# Patient Record
Sex: MALE | Race: OTHER | NOT HISPANIC OR LATINO | URBAN - METROPOLITAN AREA
[De-identification: names, ages, dates, MRNs, and addresses within clinical notes are randomized per-mention and may not be internally consistent; named-entity substitution may affect disease eponyms.]

---

## 2023-08-07 ENCOUNTER — APPOINTMENT (OUTPATIENT)
Dept: URBAN - METROPOLITAN AREA CLINIC 256 | Age: 58
Setting detail: DERMATOLOGY
End: 2023-08-07

## 2023-08-07 DIAGNOSIS — L82.1 OTHER SEBORRHEIC KERATOSIS: ICD-10-CM

## 2023-08-07 DIAGNOSIS — L91.8 OTHER HYPERTROPHIC DISORDERS OF THE SKIN: ICD-10-CM

## 2023-08-07 DIAGNOSIS — B07.8 OTHER VIRAL WARTS: ICD-10-CM

## 2023-08-07 DIAGNOSIS — L81.4 OTHER MELANIN HYPERPIGMENTATION: ICD-10-CM

## 2023-08-07 DIAGNOSIS — Z71.89 OTHER SPECIFIED COUNSELING: ICD-10-CM

## 2023-08-07 DIAGNOSIS — D22 MELANOCYTIC NEVI: ICD-10-CM

## 2023-08-07 DIAGNOSIS — D18.0 HEMANGIOMA: ICD-10-CM

## 2023-08-07 DIAGNOSIS — L57.8 OTHER SKIN CHANGES DUE TO CHRONIC EXPOSURE TO NONIONIZING RADIATION: ICD-10-CM

## 2023-08-07 PROBLEM — D22.71 MELANOCYTIC NEVI OF RIGHT LOWER LIMB, INCLUDING HIP: Status: ACTIVE | Noted: 2023-08-07

## 2023-08-07 PROBLEM — D22.61 MELANOCYTIC NEVI OF RIGHT UPPER LIMB, INCLUDING SHOULDER: Status: ACTIVE | Noted: 2023-08-07

## 2023-08-07 PROBLEM — D22.5 MELANOCYTIC NEVI OF TRUNK: Status: ACTIVE | Noted: 2023-08-07

## 2023-08-07 PROBLEM — D22.72 MELANOCYTIC NEVI OF LEFT LOWER LIMB, INCLUDING HIP: Status: ACTIVE | Noted: 2023-08-07

## 2023-08-07 PROBLEM — D18.01 HEMANGIOMA OF SKIN AND SUBCUTANEOUS TISSUE: Status: ACTIVE | Noted: 2023-08-07

## 2023-08-07 PROBLEM — D22.62 MELANOCYTIC NEVI OF LEFT UPPER LIMB, INCLUDING SHOULDER: Status: ACTIVE | Noted: 2023-08-07

## 2023-08-07 PROCEDURE — OTHER DEFER: OTHER

## 2023-08-07 PROCEDURE — 11200 RMVL SKIN TAGS UP TO&INC 15: CPT

## 2023-08-07 PROCEDURE — OTHER SUNSCREEN RECOMMENDATIONS: OTHER

## 2023-08-07 PROCEDURE — OTHER SKIN TAG REMOVAL: OTHER

## 2023-08-07 PROCEDURE — 99203 OFFICE O/P NEW LOW 30 MIN: CPT | Mod: 25

## 2023-08-07 PROCEDURE — OTHER COUNSELING: OTHER

## 2023-08-07 PROCEDURE — OTHER MIPS QUALITY: OTHER

## 2023-08-07 ASSESSMENT — LOCATION ZONE DERM
LOCATION ZONE: ARM
LOCATION ZONE: AXILLAE
LOCATION ZONE: LEG
LOCATION ZONE: FACE
LOCATION ZONE: TRUNK

## 2023-08-07 ASSESSMENT — LOCATION SIMPLE DESCRIPTION DERM
LOCATION SIMPLE: LEFT FOREARM
LOCATION SIMPLE: LEFT UPPER ARM
LOCATION SIMPLE: LEFT LOWER BACK
LOCATION SIMPLE: ABDOMEN
LOCATION SIMPLE: LEFT SHOULDER
LOCATION SIMPLE: LEFT CHEEK
LOCATION SIMPLE: RIGHT FOREARM
LOCATION SIMPLE: RIGHT THIGH
LOCATION SIMPLE: LEFT THIGH
LOCATION SIMPLE: LEFT POSTERIOR AXILLA
LOCATION SIMPLE: UPPER BACK
LOCATION SIMPLE: LEFT UPPER BACK
LOCATION SIMPLE: RIGHT UPPER BACK
LOCATION SIMPLE: CHEST
LOCATION SIMPLE: RIGHT SHOULDER
LOCATION SIMPLE: RIGHT CHEEK

## 2023-08-07 ASSESSMENT — LOCATION DETAILED DESCRIPTION DERM
LOCATION DETAILED: LEFT INFERIOR LATERAL MIDBACK
LOCATION DETAILED: RIGHT ANTERIOR PROXIMAL THIGH
LOCATION DETAILED: RIGHT VENTRAL PROXIMAL FOREARM
LOCATION DETAILED: LEFT INFERIOR LATERAL MALAR CHEEK
LOCATION DETAILED: RIGHT SUPERIOR MEDIAL UPPER BACK
LOCATION DETAILED: LEFT MEDIAL UPPER BACK
LOCATION DETAILED: RIGHT ANTERIOR DISTAL THIGH
LOCATION DETAILED: LEFT SUPERIOR LATERAL LOWER BACK
LOCATION DETAILED: RIGHT ANTERIOR SHOULDER
LOCATION DETAILED: INFERIOR THORACIC SPINE
LOCATION DETAILED: LEFT ANTECUBITAL SKIN
LOCATION DETAILED: MIDDLE STERNUM
LOCATION DETAILED: RIGHT VENTRAL DISTAL FOREARM
LOCATION DETAILED: LEFT ANTERIOR PROXIMAL THIGH
LOCATION DETAILED: LEFT DISTAL DORSAL FOREARM
LOCATION DETAILED: RIGHT MEDIAL SUPERIOR CHEST
LOCATION DETAILED: LEFT PROXIMAL DORSAL FOREARM
LOCATION DETAILED: LEFT INFERIOR CENTRAL MALAR CHEEK
LOCATION DETAILED: RIGHT DISTAL DORSAL FOREARM
LOCATION DETAILED: RIGHT CENTRAL MALAR CHEEK
LOCATION DETAILED: LEFT ANTERIOR SHOULDER
LOCATION DETAILED: EPIGASTRIC SKIN
LOCATION DETAILED: LEFT CENTRAL MALAR CHEEK
LOCATION DETAILED: LEFT ANTERIOR DISTAL THIGH
LOCATION DETAILED: LEFT POSTERIOR AXILLA
LOCATION DETAILED: RIGHT PROXIMAL DORSAL FOREARM
LOCATION DETAILED: LEFT VENTRAL DISTAL FOREARM

## 2023-08-07 NOTE — PROCEDURE: SKIN TAG REMOVAL
Include Z78.9 (Other Specified Conditions Influencing Health Status) As An Associated Diagnosis?: No
Add Associated Diagnoses If Applicable When Selecting Medical Necessity: Yes
Detail Level: Detailed
Medical Necessity Information: It is in your best interest to select a reason for this procedure from the list below. All of these items fulfill various CMS LCD requirements except the new and changing color options.
Anesthesia Type: 1% lidocaine with epinephrine
Anesthesia Volume In Cc: 0.5
Medical Necessity Clause: This procedure was medically necessary because the lesions that were treated were:
Consent: Written consent obtained and the risks of skin tag removal was reviewed with the patient including but not limited to bleeding, pigmentary change, infection, pain, and remote possibility of scarring.

## 2023-08-07 NOTE — PROCEDURE: DEFER
X Size Of Lesion In Cm (Optional): 0
Introduction Text (Please End With A Colon): The following procedure was deferred: Patient has a board meeting, will like to come back get LN2 treatment.
Detail Level: Detailed

## 2023-08-07 NOTE — PROCEDURE: MIPS QUALITY
Detail Level: Detailed
Quality 431: Preventive Care And Screening: Unhealthy Alcohol Use - Screening: Patient not identified as an unhealthy alcohol user when screened for unhealthy alcohol use using a systematic screening method
Quality 226: Preventive Care And Screening: Tobacco Use: Screening And Cessation Intervention: Tobacco Screening not Performed
Quality 130: Documentation Of Current Medications In The Medical Record: Current Medications Documented
Quality 402: Tobacco Use And Help With Quitting Among Adolescents: Patient screened for tobacco and never smoked

## 2025-06-13 ENCOUNTER — ANCILLARY PROCEDURE (OUTPATIENT)
Dept: RADIOLOGY | Facility: CLINIC | Age: 60
End: 2025-06-13
Payer: COMMERCIAL

## 2025-06-17 ENCOUNTER — ANCILLARY PROCEDURE (OUTPATIENT)
Dept: GENERAL RADIOLOGY | Facility: CLINIC | Age: 60
End: 2025-06-17
Attending: NEUROLOGICAL SURGERY
Payer: COMMERCIAL

## 2025-06-17 ENCOUNTER — OFFICE VISIT (OUTPATIENT)
Dept: NEUROSURGERY | Facility: CLINIC | Age: 60
End: 2025-06-17
Attending: NEUROLOGICAL SURGERY
Payer: COMMERCIAL

## 2025-06-17 ENCOUNTER — TELEPHONE (OUTPATIENT)
Dept: NEUROSURGERY | Facility: CLINIC | Age: 60
End: 2025-06-17
Payer: COMMERCIAL

## 2025-06-17 VITALS — HEART RATE: 63 BPM | OXYGEN SATURATION: 97 % | SYSTOLIC BLOOD PRESSURE: 129 MMHG | DIASTOLIC BLOOD PRESSURE: 81 MMHG

## 2025-06-17 DIAGNOSIS — M47.22 CERVICAL SPONDYLOSIS WITH RADICULOPATHY: ICD-10-CM

## 2025-06-17 DIAGNOSIS — M47.22 CERVICAL SPONDYLOSIS WITH RADICULOPATHY: Primary | ICD-10-CM

## 2025-06-17 PROCEDURE — 3079F DIAST BP 80-89 MM HG: CPT | Performed by: NEUROLOGICAL SURGERY

## 2025-06-17 PROCEDURE — 99213 OFFICE O/P EST LOW 20 MIN: CPT | Performed by: NEUROLOGICAL SURGERY

## 2025-06-17 PROCEDURE — 1125F AMNT PAIN NOTED PAIN PRSNT: CPT | Performed by: NEUROLOGICAL SURGERY

## 2025-06-17 PROCEDURE — 3074F SYST BP LT 130 MM HG: CPT | Performed by: NEUROLOGICAL SURGERY

## 2025-06-17 PROCEDURE — 99203 OFFICE O/P NEW LOW 30 MIN: CPT | Performed by: NEUROLOGICAL SURGERY

## 2025-06-17 RX ORDER — DEXAMETHASONE 2 MG/1
2 TABLET ORAL 2 TIMES DAILY WITH MEALS
Qty: 14 TABLET | Refills: 0 | Status: SHIPPED | OUTPATIENT
Start: 2025-06-17

## 2025-06-17 RX ORDER — ROSUVASTATIN CALCIUM 10 MG/1
10 TABLET, COATED ORAL DAILY
COMMUNITY
Start: 2025-04-25

## 2025-06-17 RX ORDER — GABAPENTIN 300 MG/1
300 CAPSULE ORAL 3 TIMES DAILY
Qty: 50 CAPSULE | Refills: 1 | Status: SHIPPED | OUTPATIENT
Start: 2025-06-17

## 2025-06-17 RX ORDER — EZETIMIBE 10 MG/1
10 TABLET ORAL DAILY
COMMUNITY
Start: 2025-05-01

## 2025-06-17 ASSESSMENT — PAIN SCALES - GENERAL: PAINLEVEL_OUTOF10: SEVERE PAIN (7)

## 2025-06-17 NOTE — NURSING NOTE
Robert Bella is a 60 year old male who presents for:  Chief Complaint   Patient presents with    Consult     scheduled per Dr. Abrams staff message          Initial Vitals:  /81   Pulse 63   SpO2 97%  There is no height or weight on file to calculate BMI.. There is no height or weight on file to calculate BSA. BP completed using cuff size: regular  Severe Pain (7)    Nursing Comments:     Gena Santoro

## 2025-06-17 NOTE — LETTER
6/17/2025      Robert Bella  [address] [facility] [place] [address]      Dear Colleague,    Thank you for referring your patient, Robert Bella, to the [facility]OS[facility] [place]. Please see a copy of my visit note below.    It was a pleasure to see Robert Bella today in Neurosurgery Clinic. He is a 60 year old male who is here for evaluation of his right upper extremity radiculopathy.  His symptoms began in May.  This began with some pain on the back of the forearm and the elbow but then extended into the parascapular area.  He notes some burning in the tricep area and forearm with numbness mainly of the 2nd and 3rd fingers in the palm of the hand.    He has noted some weakness of the right upper extremity and feels like he is having fasciculations of the tricep and pectoral muscles.  He does have occasional mild left forearm symptoms as well.  His symptoms are better when he lays on his back and worse when he is upright.    He has been taking ibuprofen and Tylenol.  He has not done any physical therapy or injections at this point.          No past medical history on file.  Past Medical History reviewed with patient during visit.  No past surgical history on file.  Past Surgical History reviewed with patient during visit.  No Known Allergies    Current Outpatient Medications:      ezetimibe (ZETIA) 10 MG tablet, Take 10 mg by mouth daily., Disp: , Rfl:      rosuvastatin (CRESTOR) 10 MG tablet, Take 10 mg by mouth daily., Disp: , Rfl:   Social History     Socioeconomic History     Marital status:      Spouse name: None     Number of children: None     Years of education: None     Highest education level: None   Tobacco Use     Smoking status: Never     Smokeless tobacco: Never     Social Drivers of Health     Food Insecurity: No Food Insecurity (4/20/2025)    Received from [facility]    Hunger Vital Sign      Worried About Running Out of Food in the Last Year: Never true      Ran Out  of Food in the Last Year: Never true   Transportation Needs: No Transportation Needs (4/20/2025)    Received from [facility]    PRAPARE - Transportation      Lack of Transportation (Medical): No      Lack of Transportation (Non-Medical): No   Physical Activity: Sufficiently Active (4/20/2025)    Received from [facility]    Exercise Vital Sign      Days of Exercise per Week: 3 days      Minutes of Exercise per Session: 50 min   Housing Stability: Low Risk  (4/20/2025)    Received from [facility]    Housing Stability      What is your living situation today?: I have a steady place to live     No family history on file.     ROS: 10 point ROS neg other than the symptoms noted above in the HPI.    Vitals:    06/17/25 1316   BP: 129/81   Pulse: 63   SpO2: 97%     There is no height or weight on file to calculate BMI.  Severe Pain (7)    Neck Disability Index      6/17/2025     1:00 PM   Neck Disability Index (  [employer]. 1991. All rights reserved.; used with permission)   SECTION 1 - PAIN INTENSITY 4   SECTION 2 - PERSONAL CARE 2   SECTION 3 - LIFTING 3   SECTION 4 - READING 2   SECTION 5 - HEADACHES 0   SECTION 6 - CONCENTRATION 1   SECTION 7 - WORK 3   SECTION 8 - DRIVING 3   SECTION 9 - SLEEPING 4   SECTION 10 - RECREATION 4   Count 10   Sum 26   Raw Score: /50 26   Neck Disability Index Score: (%) 52 %       Visual Analog Scale (VAS) Questionnaire         No data to display                   Awake and alert  Bilateral upper extremity strength is 5 out of 5 in all muscle groups except for 4 out of 5 right tricep  Sensation intact to pinprick  Positive Spurling sign on right.    Imaging: Review of his cervical MRI shows foraminal stenosis on the right at C6-7 which is consistent with his current symptomatology.  He has other levels of degenerative changes but I think his main symptoms are coming from this area.  The imaging was reviewed with the patient show to the patient clinic today.    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    Plan: At this point we have talked about maximizing medical therapies including physical therapy with home traction, epidural steroid injection, gabapentin, oral steroids.  I would like to see how these things go as I think there is a decent chance that his symptoms may improve with time and these interventions.  However if they continue to be problematic we discussed briefly surgical approaches and for the time being a right C6-7 posterior cervical foraminotomy would be the preferred surgical choice.  Will see how he does over time.  We will also obtain cervical 4 view x-rays today.       Again, thank you for allowing me to participate in the care of your patient.        Sincerely,        Davian Abrams MD    Electronically signed

## 2025-06-17 NOTE — TELEPHONE ENCOUNTER
----- Message from Davian Abrams sent at 6/16/2025  3:12 PM CDT -----  Regarding: cervical radiculopathy  Can you get Dr. Bella in to see me this week? Ok to overbook if needed. MRI at [facility].    Lee

## 2025-06-17 NOTE — PROGRESS NOTES
It was a pleasure to see Robert Bella today in Neurosurgery Clinic. He is a 60 year old male who is here for evaluation of his right upper extremity radiculopathy.  His symptoms began in May.  This began with some pain on the back of the forearm and the elbow but then extended into the parascapular area.  He notes some burning in the tricep area and forearm with numbness mainly of the 2nd and 3rd fingers in the palm of the hand.    He has noted some weakness of the right upper extremity and feels like he is having fasciculations of the tricep and pectoral muscles.  He does have occasional mild left forearm symptoms as well.  His symptoms are better when he lays on his back and worse when he is upright.    He has been taking ibuprofen and Tylenol.  He has not done any physical therapy or injections at this point.          No past medical history on file.  Past Medical History reviewed with patient during visit.  No past surgical history on file.  Past Surgical History reviewed with patient during visit.  No Known Allergies    Current Outpatient Medications:     ezetimibe (ZETIA) 10 MG tablet, Take 10 mg by mouth daily., Disp: , Rfl:     rosuvastatin (CRESTOR) 10 MG tablet, Take 10 mg by mouth daily., Disp: , Rfl:   Social History     Socioeconomic History    Marital status:      Spouse name: None    Number of children: None    Years of education: None    Highest education level: None   Tobacco Use    Smoking status: Never    Smokeless tobacco: Never     Social Drivers of Health     Food Insecurity: No Food Insecurity (4/20/2025)    Received from [facility]    Hunger Vital Sign     Worried About Running Out of Food in the Last Year: Never true     Ran Out of Food in the Last Year: Never true   Transportation Needs: No Transportation Needs (4/20/2025)    Received from [facility]    PRAPARE - Transportation     Lack of Transportation (Medical): No     Lack of Transportation (Non-Medical): No   Physical Activity:  Sufficiently Active (4/20/2025)    Received from [facility]    Exercise Vital Sign     Days of Exercise per Week: 3 days     Minutes of Exercise per Session: 50 min   Housing Stability: Low Risk  (4/20/2025)    Received from [facility]    Housing Stability     What is your living situation today?: I have a steady place to live     No family history on file.     ROS: 10 point ROS neg other than the symptoms noted above in the HPI.    Vitals:    06/17/25 1316   BP: 129/81   Pulse: 63   SpO2: 97%     There is no height or weight on file to calculate BMI.  Severe Pain (7)    Neck Disability Index      6/17/2025     1:00 PM   Neck Disability Index (  [employer]. 1991. All rights reserved.; used with permission)   SECTION 1 - PAIN INTENSITY 4   SECTION 2 - PERSONAL CARE 2   SECTION 3 - LIFTING 3   SECTION 4 - READING 2   SECTION 5 - HEADACHES 0   SECTION 6 - CONCENTRATION 1   SECTION 7 - WORK 3   SECTION 8 - DRIVING 3   SECTION 9 - SLEEPING 4   SECTION 10 - RECREATION 4   Count 10   Sum 26   Raw Score: /50 26   Neck Disability Index Score: (%) 52 %       Visual Analog Scale (VAS) Questionnaire         No data to display                   Awake and alert  Bilateral upper extremity strength is 5 out of 5 in all muscle groups except for 4 out of 5 right tricep  Sensation intact to pinprick  Positive Spurling sign on right.    Imaging: Review of his cervical MRI shows foraminal stenosis on the right at C6-7 which is consistent with his current symptomatology.  He has other levels of degenerative changes but I think his main symptoms are coming from this area.  The imaging was reviewed with the patient show to the patient clinic today.    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    Plan: At this point we have talked about maximizing medical therapies including physical therapy with home traction, epidural steroid injection, gabapentin, oral steroids.  I would like to see how these  things go as I think there is a decent chance that his symptoms may improve with time and these interventions.  However if they continue to be problematic we discussed briefly surgical approaches and for the time being a right C6-7 posterior cervical foraminotomy would be the preferred surgical choice.  Will see how he does over time.  We will also obtain cervical 4 view x-rays today.

## 2025-06-17 NOTE — PATIENT INSTRUCTIONS
Patient Next Steps:    Order placed for epidural steroid injection  The steroid can take 10-14 days to reach max effect  Please call our clinic if symptoms persist after this timeframe.  You can call to schedule injection at [phone]     Order placed for physical therapy. You can call the phone number highlighted in the order to schedule your appointment. Please call our clinic if symptoms persist after your course of physical therapy.  If you have not heard from the scheduling office within 2 business days, please call [phone] for [facility] [place], [phone] for [facility] and [phone] for [facility].    Order placed for XR to be completed today    Gabapentin and decadron sent to your pharmacy      Please call us if you have any further questions or concerns.    [facility]   Phone: [phone]  Fax: [phone]

## 2025-06-21 DIAGNOSIS — M54.12 CERVICAL RADICULOPATHY: Primary | ICD-10-CM

## 2025-06-21 RX ORDER — DIAZEPAM 5 MG/1
5-10 TABLET ORAL
OUTPATIENT
Start: 2025-06-21

## 2025-06-23 ENCOUNTER — THERAPY VISIT (OUTPATIENT)
Dept: PHYSICAL THERAPY | Facility: CLINIC | Age: 60
End: 2025-06-23
Attending: NEUROLOGICAL SURGERY
Payer: COMMERCIAL

## 2025-06-23 DIAGNOSIS — M47.22 CERVICAL SPONDYLOSIS WITH RADICULOPATHY: ICD-10-CM

## 2025-06-23 PROCEDURE — 97161 PT EVAL LOW COMPLEX 20 MIN: CPT | Mod: GP | Performed by: PHYSICAL THERAPIST

## 2025-06-23 PROCEDURE — 97530 THERAPEUTIC ACTIVITIES: CPT | Mod: GP | Performed by: PHYSICAL THERAPIST

## 2025-06-23 NOTE — PROGRESS NOTES
PHYSICAL THERAPY EVALUATION  Type of Visit: Evaluation          Fall Risk Screen:       Subjective      Robert reports onset of right back of the forearm and the elbow but then extended into the parascapular area with prolonged typing on lap top at home on May 1, 2025.  This is a new problems Since then, symptoms got worse. Present complaints include constant  right right forearm burning,  intermittent right muscle facilculatins in right triceps and pec muscles. intermittent  right neck pain with radiation to to dorsal forearm, and intermittent right 2-3 digit tingling.  He also reports right tricep weakness with pushing. Worse  with laying on right side, and typing . Better with laying on left side with arm propped a certain way, tylenol ( 500 mg) and ibuprofin ( 600-800 mg).  Pain level 6/10 with meds and 9/10 without medications.  Pain is stabbing and burning.                 Past Medical History   No past medical history on file.     Past Medical History reviewed with patient during visit.  Past Surgical History   No past surgical history on file.     Past Surgical History reviewed with patient during visit.  Allergies   No Known Allergies       Current Medications      Current Outpatient Medications:     ezetimibe (ZETIA) 10 MG tablet, Take 10 mg by mouth daily., Disp: , Rfl:     rosuvastatin (CRESTOR) 10 MG tablet, Take 10 mg by mouth daily., Disp: , Rfl:      Social History            Socioeconomic History    Marital status:        Spouse name: None    Number of children: None    Years of education: None    Highest education level: None   Tobacco Use    Smoking status: Never    Smokeless tobacco: Never      Social Drivers of Health           Food Insecurity: No Food Insecurity (4/20/2025)     Received from [facility]     Hunger Vital Sign      Worried About Running Out of Food in the Last Year: Never true      Ran Out of Food in the Last Year: Never true   Transportation Needs: No Transportation Needs  (4/20/2025)     Received from [facility]     PRAPARE - Transportation      Lack of Transportation (Medical): No      Lack of Transportation (Non-Medical): No   Physical Activity: Sufficiently Active (4/20/2025)     Received from [facility]     Exercise Vital Sign      Days of Exercise per Week: 3 days      Minutes of Exercise per Session: 50 min   Housing Stability: Low Risk  (4/20/2025)     Received from [facility]     Housing Stability      What is your living situation today?: I have a steady place to live      Family History   No family history on file.         ROS: 10 point ROS neg other than the symptoms noted above in the HPI.     Vitals       Vitals:     06/17/25 1316   BP: 129/81   Pulse: 63   SpO2: 97%         There is no height or weight on file to calculate BMI.  Severe Pain (7)     Neck Disability Index       6/17/2025     1:00 PM   Neck Disability Index (  [employer]. 1991. All rights reserved.; used with permission)   SECTION 1 - PAIN INTENSITY 4   SECTION 2 - PERSONAL CARE 2   SECTION 3 - LIFTING 3   SECTION 4 - READING 2   SECTION 5 - HEADACHES 0   SECTION 6 - CONCENTRATION 1   SECTION 7 - WORK 3   SECTION 8 - DRIVING 3   SECTION 9 - SLEEPING 4   SECTION 10 - RECREATION 4   Count 10   Sum 26   Raw Score: /50 26   Neck Disability Index Score: (%) 52 %         Visual Analog Scale (VAS) Questionnaire            No data to display                      Awake and alert  Bilateral upper extremity strength is 5 out of 5 in all muscle groups except for 4 out of 5 right tricep  Sensation intact to pinprick  Positive Spurling sign on right.     Imaging: Review of his cervical MRI shows foraminal stenosis on the right at C6-7 which is consistent with his current symptomatology.  He has other levels of degenerative changes but I think his main symptoms are coming from this area.  The imaging was reviewed with the patient show to the patient clinic today.     Assessment: Right C6-7 foraminal  stenosis/spondylosis with radiculopathy.     Plan: At this point we have talked about maximizing medical therapies including physical therapy with home traction, epidural steroid injection, gabapentin, oral steroids.  I would like to see how these things go as I think there is a decent chance that his symptoms may improve with time and these interventions.  However if they continue to be problematic we discussed briefly surgical approaches and for the time being a right C6-7 posterior cervical foraminotomy would be the preferred surgical choice.  Will see how he does over time.  We will also obtain cervical 4 view x-rays today.        XRAY: IMPRESSION: Multilevel mild to moderate degenerative disc disease. Multiple mild facet arthropathy. Reversal of the normal cervical lordosis. Subtle grade 1 anterolisthesis of C2 on C3. No high-grade subluxations on flexion/extension views.     MR results  C6-C7 disc protrusion         Presenting condition or subjective complaint:    Date of onset:      Relevant medical history:      Dates & types of surgery: Lumbar discetomy 30 years ago     Prior diagnostic imaging/testing results:       Prior therapy history for the same diagnosis, illness or injury:        Prior Level of Function  Transfers: Independent  Ambulation: Independent  ADL: Independent  IADL: typing symptom free and for unlimited time     Living Environment  Social support:     Type of home:     Stairs to enter the home:         Ramp:     Stairs inside the home:         Help at home:    Equipment owned:       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       Hobbies/Interests:      Patient goals for therapy:      Pain assessment:  see subjective     Objective   CERVICAL SPINE EVALUATION  PAIN:  see subjective  INTEGUMENTARY (edema, incisions):  na  POSTURE:  decreased cervical lordosis, , rounded shoulders   GAIT:   Weightbearing Status: na  Assistive Device(s): na  Gait Deviations:  na  BALANCE/PROPRIOCEPTION: na  WEIGHTBEARING ALIGNMENT: na  ROM:   (Degrees) Left AROM Right AROM    Cervical Flexion 75% with right medial scapular pain and burning right forearm    Cervical Extension 15% with right medial scapular pain and burning right forearm    Cervical Side bend 10% medial scapular pain  5% medial scapular pain     Cervical Rotation 25% medial scapular pain  10 % medial scapular pain     Cervical Protrusion     Cervical Retraction     Thoracic Flexion     Thoracic Extension     Thoracic Rotation       Left AROM Left PROM Right AROM Right PROM   Shoulder Flexion       Shoulder Extension       Shoulder Abduction       Shoulder Adduction       Shoulder IR       Shoulder ER       Shoulder Horiz Abduction       Shoulder Horiz Adduction       Pain:   End Feel:      MYOTOMES:    Left Right   C1-2 (Neck Flexion) na na   C3 (Neck Side Bend)  na na   C4 (Shrug) na  na   C5 (Deltoid) 5 5   C6 (Biceps) 5 5   C7 (Triceps) 5 3+   C8 (Thumb Ext) na na   T1 (Intrinsics)  na na     DTR S:  na  CORD SIGNS:  na  DERMATOMES:    Left Right   C1 na  na   C2  na na   C3 na  na   C4  na na   C5 Normal (light touch) Normal (light touch)   C6 Normal (light touch) Normal (light touch)   C7 Normal (light touch) Normal (light touch)   C8 Normal (light touch) Normal (light touch)   T1  Normal   Normal      NEURAL TENSION:  na  FLEXIBILITY:  na   SPECIAL TESTS:  na  PALPATION:  right upper trapezius and levator scapula muscle tightness   SPINAL SEGMENTAL CONCLUSIONS:  na      Assessment & Plan   CLINICAL IMPRESSIONS  Medical Diagnosis: Cervical spondylosis with radiculopathy    Treatment Diagnosis: Cervical spondylosis with radiculopathy   Impression/Assessment: Patient is a 60 year old male with neck complaints.  The following significant findings have been identified: Pain, Decreased ROM/flexibility, Decreased strength, and Decreased activity tolerance. These impairments interfere with their ability to perform work tasks  as compared to previous level of function.     Clinical Decision Making (Complexity):  Clinical Presentation: Stable/Uncomplicated  Clinical Presentation Rationale: based on medical and personal factors listed in PT evaluation  Clinical Decision Making (Complexity): Low complexity    PLAN OF CARE  Treatment Interventions:  Modalities: Cryotherapy, Hot Pack  Interventions: Manual Therapy, Neuromuscular Re-education, Therapeutic Activity, Therapeutic Exercise, Self-Care/Home Management    Long Term Goals            Frequency of Treatment: 1x/ week  Duration of Treatment: 6 weeks    Recommended Referrals to Other Professionals:  none needed   Education Assessment:        Risks and benefits of evaluation/treatment have been explained.   Patient/Family/caregiver agrees with Plan of Care.     Evaluation Time:        Evaluation Only     Signing Clinician: Halina Miramontes, PT

## 2025-06-24 ENCOUNTER — THERAPY VISIT (OUTPATIENT)
Dept: PHYSICAL THERAPY | Facility: CLINIC | Age: 60
End: 2025-06-24
Payer: COMMERCIAL

## 2025-06-24 ENCOUNTER — TELEPHONE (OUTPATIENT)
Dept: ANESTHESIOLOGY | Facility: CLINIC | Age: 60
End: 2025-06-24

## 2025-06-24 DIAGNOSIS — M47.22 CERVICAL SPONDYLOSIS WITH RADICULOPATHY: Primary | ICD-10-CM

## 2025-06-24 PROCEDURE — 97530 THERAPEUTIC ACTIVITIES: CPT | Mod: GP | Performed by: PHYSICAL THERAPIST

## 2025-06-24 PROCEDURE — 97140 MANUAL THERAPY 1/> REGIONS: CPT | Mod: GP | Performed by: PHYSICAL THERAPIST

## 2025-06-24 NOTE — TELEPHONE ENCOUNTER
Phoned the patient and left VM. Stated to call the pain clinic to schedule injection procedure at [phone].

## 2025-06-29 ENCOUNTER — HEALTH MAINTENANCE LETTER (OUTPATIENT)
Age: 60
End: 2025-06-29

## 2025-07-01 ENCOUNTER — MYC MEDICAL ADVICE (OUTPATIENT)
Dept: NEUROSURGERY | Facility: CLINIC | Age: 60
End: 2025-07-01
Payer: COMMERCIAL

## 2025-07-01 ENCOUNTER — TELEPHONE (OUTPATIENT)
Dept: NEUROSURGERY | Facility: CLINIC | Age: 60
End: 2025-07-01
Payer: COMMERCIAL

## 2025-07-01 NOTE — TELEPHONE ENCOUNTER
[facility] Call Center    Phone Message    May a detailed message be left on voicemail: yes     Reason for Call: Natalia from the [facility] called.  She received an order for an injection for this Pt.  In order for them to do this the Pt would needed to have had an MRI in the past year.  Please call her back to let her know if Pt has had an MRI or not.

## 2025-07-01 NOTE — TELEPHONE ENCOUNTER
Patient requesting JOHNSON order be sent to [facility].     Faxed injection order July 1, 2025 to fax number [phone]    Right Fax confirmed at 11:39 AM

## 2025-07-01 NOTE — TELEPHONE ENCOUNTER
Cervical MRI completed at [facility] on 6/13/25.     Returned call to provide update, left detailed VM with above information.

## 2025-07-07 ENCOUNTER — TELEPHONE (OUTPATIENT)
Dept: ANESTHESIOLOGY | Facility: CLINIC | Age: 60
End: 2025-07-07
Payer: COMMERCIAL

## 2025-07-07 ENCOUNTER — MYC MEDICAL ADVICE (OUTPATIENT)
Dept: NEUROSURGERY | Facility: CLINIC | Age: 60
End: 2025-07-07
Payer: COMMERCIAL

## 2025-07-07 DIAGNOSIS — M47.22 CERVICAL SPONDYLOSIS WITH RADICULOPATHY: Primary | ICD-10-CM

## 2025-07-07 NOTE — TELEPHONE ENCOUNTER
Third attempt: phoned the patient to schedule injection procedure with dr. Weiss. Patient stated he already had the injection procedure at another location.

## 2025-07-11 NOTE — TELEPHONE ENCOUNTER
Patient had C6C7 Transforaminal Epidual Steroid Injection (TFESI) by Mireya Rojas MD - a musculoskeletal radiologist with [facility].      Per patient, radiologist suggested patient schedule another Right C6C7 TFESI in 4 weeks, in case today s injection doesn t provide  adequate pain relief.  If today s injection suffices, he will cancel the injection.    Request routed to Dr. Abrams for review.

## 2025-07-14 NOTE — TELEPHONE ENCOUNTER
Faxed pain management referral July 14, 2025 to fax number [phone]    Right Fax confirmed at 09:56 AM    Donna Hayes

## 2025-07-14 NOTE — TELEPHONE ENCOUNTER
Per roni Edwards to place injection order. Order placed. Patient updated via [employer]. Encounter routed to CSS to assist with faxing.

## 2025-07-16 NOTE — TELEPHONE ENCOUNTER
Re-faxed pain management order per request of [facility] 07/16/25. Rightfax confirmed 12:44 PM

## 2025-07-21 ENCOUNTER — TELEPHONE (OUTPATIENT)
Dept: NEUROSURGERY | Facility: CLINIC | Age: 60
End: 2025-07-21
Payer: COMMERCIAL

## 2025-07-21 NOTE — TELEPHONE ENCOUNTER
Attempted to reach patient to remind them about appointment scheduled with Davian Abrams MD on 7/22/25 in our [facility] clinic.    A voicemail was left with a call back number if the patient has questions or would like to reschedule.

## 2025-07-22 ENCOUNTER — OFFICE VISIT (OUTPATIENT)
Dept: NEUROSURGERY | Facility: CLINIC | Age: 60
End: 2025-07-22
Attending: NEUROLOGICAL SURGERY
Payer: COMMERCIAL

## 2025-07-22 VITALS — SYSTOLIC BLOOD PRESSURE: 115 MMHG | HEART RATE: 79 BPM | DIASTOLIC BLOOD PRESSURE: 79 MMHG | OXYGEN SATURATION: 98 %

## 2025-07-22 DIAGNOSIS — M47.22 CERVICAL SPONDYLOSIS WITH RADICULOPATHY: Primary | ICD-10-CM

## 2025-07-22 PROCEDURE — 99213 OFFICE O/P EST LOW 20 MIN: CPT | Performed by: NEUROLOGICAL SURGERY

## 2025-07-22 PROCEDURE — 1125F AMNT PAIN NOTED PAIN PRSNT: CPT | Performed by: NEUROLOGICAL SURGERY

## 2025-07-22 PROCEDURE — 3078F DIAST BP <80 MM HG: CPT | Performed by: NEUROLOGICAL SURGERY

## 2025-07-22 PROCEDURE — 99214 OFFICE O/P EST MOD 30 MIN: CPT | Performed by: NEUROLOGICAL SURGERY

## 2025-07-22 PROCEDURE — 3074F SYST BP LT 130 MM HG: CPT | Performed by: NEUROLOGICAL SURGERY

## 2025-07-22 ASSESSMENT — PAIN SCALES - GENERAL: PAINLEVEL_OUTOF10: MODERATE PAIN (6)

## 2025-07-22 NOTE — NURSING NOTE
Labs ordered, pt notified   PRE-SURGERY EDUCATION  Reviewed pre- and post-operative instructions as outlined in the After Visit Summary/Patient Instructions with patient.   Surgery folder was given to patient    Patient Education Topic: Procedure with Dr. Abrams   Learner(s): Patient  Knowledge Level: Basic  Readiness to Learn: Ready  Method:  Verbal explanation and Written material   Outcome: Able to verbalize instructions  Barriers to Learning: No barrier  STD/FMLA: No  Job Description: Not applicable   Time Off: Not applicable , patient does not think he will use STD/FMLA. He will contact us if he changes his mind.     Patient had the opportunity for questions to be answered. Advised Patient to call clinic with any questions/concerns. Gave patient antibacterial soap for pre-surgery skin preparation.     Non-fusion SPINE PRE-SURGICAL CHECKLIST   Intervention/Diagnostic Meets Criteria Details   NDI/LIANNA  Completed within the last 6 months Yes Confirmation of completion within last 6 months   Nicotine/Smoking Status  Never   Currently using: None Yes Non-fusion: no action needed     Physical Therapy   Completed within 6 months   Completed on the corresponding body part  Completed at least 4 weeks (unless provider documented that patient unable to tolerate PT) No  Records verified and located N/A    PT not completed   Injection  Completed within last 1 years  Completed on the corresponding body part   Yes  Records verified and located Received       Imaging  MRI or CT completed within 6 months Yes Records verified and located Received   Chronic Pain or Pain contract  No Yes N/A

## 2025-07-22 NOTE — NURSING NOTE
Faxed injection order to [facility] rad July 22, 2025 to fax number [phone]    Right Fax confirmed at 11:42 AM

## 2025-07-22 NOTE — PROGRESS NOTES
It was a pleasure to see Dr. Robert Bella today in Neurosurgery Clinic. He is a 60 year old male with right radicular symptoms in the C7 distribution.    Since his last visit he had an injection which may be provided some mild relief.  He feels like his symptoms are somewhat better than they have been and he needs less pain medicine but he continues to have numbness and what sounds like some apraxia of the right upper extremity and weakness of the tricep.    Vitals:    07/22/25 1043   BP: 115/79   Pulse: 79   SpO2: 98%     There is no height or weight on file to calculate BMI.  Moderate Pain (6)    Exam stable including 4 out of 5 right tricep weakness    Imaging: Review of his prior cervical MRI shows foraminal stenosis at C6-7 on the right which is concordant with his current symptomatology.  The imaging was reviewed with the patient and the patient in clinic today.    Assessment: C6-7 spondylosis with radiculopathy, right.    Plan: Overall I think he seems to have plateaued somewhat in his symptoms that he has had since May.  If he wished to proceed with surgery I think this would be reasonable for us to schedule a right C6-7 posterior cervical foraminotomy.  We also discussed potentially waiting a little bit longer to see if he makes any further progress.  At this point the patient would like to move forward with scheduling surgery as well as perhaps another injection in the meantime.  We will see how he does with this and work on scheduling him for a right minimally invasive C6-7 posterior cervical foraminotomies.

## 2025-07-22 NOTE — LETTER
7/22/2025      Robert Shayne  [address] [facility] [place] [address]      Dear Colleague,    Thank you for referring your patient, Robert Bella, to the [facility] [place]. Please see a copy of my visit note below.    It was a pleasure to see Dr. Robert Shayne today in Neurosurgery Clinic. He is a 60 year old male with right radicular symptoms in the C7 distribution.    Since his last visit he had an injection which may be provided some mild relief.  He feels like his symptoms are somewhat better than they have been and he needs less pain medicine but he continues to have numbness and what sounds like some apraxia of the right upper extremity and weakness of the tricep.    Vitals:    07/22/25 1043   BP: 115/79   Pulse: 79   SpO2: 98%     There is no height or weight on file to calculate BMI.  Moderate Pain (6)    Exam stable including 4 out of 5 right tricep weakness    Imaging: Review of his prior cervical MRI shows foraminal stenosis at C6-7 on the right which is concordant with his current symptomatology.  The imaging was reviewed with the patient and the patient in clinic today.    Assessment: C6-7 spondylosis with radiculopathy, right.    Plan: Overall I think he seems to have plateaued somewhat in his symptoms that he has had since May.  If he wished to proceed with surgery I think this would be reasonable for us to schedule a right C6-7 posterior cervical foraminotomy.  We also discussed potentially waiting a little bit longer to see if he makes any further progress.  At this point the patient would like to move forward with scheduling surgery as well as perhaps another injection in the meantime.  We will see how he does with this and work on scheduling him for a right minimally invasive C6-7 posterior cervical foraminotomies.       Again, thank you for allowing me to participate in the care of your patient.        Sincerely,        Davian Abrams MD    Electronically signed

## 2025-07-22 NOTE — NURSING NOTE
Robert Bella is a 60 year old male who presents for:  Chief Complaint   Patient presents with    RECHECK     follow up after injection (07/11/2025)        Initial Vitals:  /79   Pulse 79   SpO2 98%  There is no height or weight on file to calculate BMI.. There is no height or weight on file to calculate BSA. BP completed using cuff size: regular  Moderate Pain (6)      Gena aSntoro

## 2025-07-22 NOTE — PATIENT INSTRUCTIONS
Patient Next Steps:    Order placed for epidural steroid injection  The steroid can take 10-14 days to reach max effect  Please call our clinic if symptoms persist after this timeframe.  Please call us if you have any further questions or concerns.    [facility]   Phone: [phone]  Fax: [phone]     Patient Instructions    Surgery scheduled at [facility] for Right Cervical 6 to Cervical 7 minimally invasive posterior cervical foraminotomy  with Dr. Abrams     Pre-Operative  Surgical risks: blood clots in the leg or lung, problems urinating, nerve damage, drainage from the incision, infection,stiffness  Pre-operative physical with primary care physician within 30 days of surgical date.   Likely same day procedure with discharge home day of surgery, may stay for 23 hour observation hospitalization for monitoring.     Shower procedure  Please shower with antimicrobial soap the night before surgery and morning of surgery. Please refer to showering instruction sheet in folder.  Eating/Drinking  Stop all solid foods 8 hours before surgery.  Keep drinking clear liquids until 4 hours before surgery  Clear liquids include water, clear juice, black coffee, or clear tea without milk, Gatorade, clear soda.   Medications  Hold Aspirin, NSAIDs (Advil/Ibuprofen, Indocin, Naproxen,Nuprin,Relafen/Nabumetone, Diclofenac,Meloxicam, Aleve, Celebrex) x 7 days prior to surgical date  You can take Tylenol (Acetaminophen) for pain, 1000 mg  Do not exceed 3,000 mg per day   If you are on chronic pain medication (oxycodone, Percocet, hydrocodone, Vicodin, Norco, Dilaudid, morphine, MS Contin, naltrexone, Suboxone, etc) or have a pain contract we will reach out to your pain clinic to gather your most recent records and recommendations for pain management post-op.  Please ask your provider who manages your chronic pain if they require you to schedule an office visit prior to surgery.  Continue obtaining your pain medications from your current provider until surgery. Our team will manage your acute post-op pain in the hospital and during the recovery period. Your pain team will continue to manage your chronic pain.   Any other medications prescribed, please discuss with your primary care provider at your pre-operative physical   You may NOT receive the COVID-19 vaccine 72 hours before or after surgery.    Pain Management  You will have post-operative incisional pain which may require pain medications and muscle relaxants. You will receive medication upon discharge.  You may resume taking NSAIDs (ex. Ibuprofen, aleve, naproxen) immediately post-op   Do NOT drive while taking narcotic pain medication  Do NOT drink alcohol while using any pain medication  You can utilize ice as needed (no longer than 20 minutes at one time)  Avoid putting heat on the incision    Incision Care  No submerging incision in water such as pools, hot tubs, baths for at least 8 weeks or until incision is healed  It is okay to shower, just pat the incision dry   Remove dressing as instructed upon discharge  Watch for signs of infection  Redness, swelling, warmth, drainage, and fever of 101 degrees or higher  Notify clinic [phone].    Activity Restrictions  For the first 6-8 weeks, no lifting > 10 pounds, limited bending, twisting, or overhead reaching.  Take stairs in moderation   Ok to walk as tolerated, take short frequent walks. You may gradually increase the distance as tolerated.   Avoid bed rest and prolonged sitting for longer than 30 minutes (change positions frequently while awake)  No contact sports until after follow up visit  No high impact activities such as; running/jogging, snowmobile or 4 hutchins riding or any other recreational vehicles  Please call the clinic if you develop any of the following symptoms:  Swelling and/or warmth in one or both legs  Pain or tenderness in your leg, ankle, foot, or arm    Red or discolored skin     Post-Op Follow Up Appointments  2 week incision check with RN  6 week post op follow up visit with Physician Assistant   3 months post op with Dr. Abrams  Please call to schedule follow up appointment at [phone]    Resources  If you are currently employed, you will need to be off work for 2-4 weeks for post op recovery and healing.  Please fax any FMLA/short term disability paperwork to [phone]  You may call our clinic when you'd like to return to work and we can provide a work letter  To allow staff adequate time to complete paperwork, please send as soon as possible       [facility]  Phone: [phone]  Fax: [phone]

## 2025-07-23 ENCOUNTER — TELEPHONE (OUTPATIENT)
Dept: NEUROSURGERY | Facility: CLINIC | Age: 60
End: 2025-07-23
Payer: COMMERCIAL

## 2025-07-24 ENCOUNTER — HOSPITAL ENCOUNTER (OUTPATIENT)
Facility: CLINIC | Age: 60
End: 2025-07-24
Attending: NEUROLOGICAL SURGERY | Admitting: NEUROLOGICAL SURGERY
Payer: COMMERCIAL

## 2025-07-24 NOTE — TELEPHONE ENCOUNTER
Called patient to schedule surgery with Dr. Abrams    Spoke with:  Robert (Patient)    Date of Surgery: 8/18/25    Estimated Arrival time Discussed with Patient:  Yes    [facility] [place]     Pre-Op H&P: [facility]    Post-Op Appts: Scheduled     Discussed with patient pre-op RN will call 2-3 days prior to surgery with arrival time and instructions:  Yes     Packet sent out: Yes 07/24/25  via Received in clinic by Received in clinic    Surgical soap: Receiving via Received in clinic by Received in clinic    Same Day Surgery: Yes  If Yes: Informed patient they will need a  to drive them home:  Yes  Patients : Patient's Wife     All patients questions were answered and patient was instructed to review surgical packet and call back with any questions or concerns.       Dalia Cai on 7/24/2025 at 9:34 AM

## 2025-08-04 ENCOUNTER — THERAPY VISIT (OUTPATIENT)
Dept: PHYSICAL THERAPY | Facility: CLINIC | Age: 60
End: 2025-08-04
Payer: COMMERCIAL

## 2025-08-04 DIAGNOSIS — M47.22 CERVICAL SPONDYLOSIS WITH RADICULOPATHY: Primary | ICD-10-CM

## 2025-08-04 PROCEDURE — 97530 THERAPEUTIC ACTIVITIES: CPT | Mod: GP | Performed by: PHYSICAL THERAPIST

## 2025-08-04 PROCEDURE — 97140 MANUAL THERAPY 1/> REGIONS: CPT | Mod: GP | Performed by: PHYSICAL THERAPIST
